# Patient Record
Sex: FEMALE | Employment: UNEMPLOYED | URBAN - METROPOLITAN AREA
[De-identification: names, ages, dates, MRNs, and addresses within clinical notes are randomized per-mention and may not be internally consistent; named-entity substitution may affect disease eponyms.]

---

## 2018-03-07 ENCOUNTER — TRANSFERRED RECORDS (OUTPATIENT)
Dept: HEALTH INFORMATION MANAGEMENT | Facility: CLINIC | Age: 33
End: 2018-03-07

## 2018-03-07 LAB
CHOLEST SERPL-MCNC: 214 MG/DL (ref 100–199)
CREAT SERPL-MCNC: 0.69 MG/DL (ref 0.57–1.11)
GFR SERPL CREATININE-BSD FRML MDRD: >60 ML/MIN/1.73M2
GLUCOSE SERPL-MCNC: 87 MG/DL (ref 65–100)
HBA1C MFR BLD: 5.7 % (ref 0–5.7)
HDLC SERPL-MCNC: 35 MG/DL
LDLC SERPL CALC-MCNC: 109 MG/DL
NONHDLC SERPL-MCNC: 179 MG/DL
POTASSIUM SERPL-SCNC: 4.3 MMOL/L (ref 3.5–5)
TRIGL SERPL-MCNC: 350 MG/DL
TSH SERPL-ACNC: 3.93 UIU/ML (ref 0.35–4.94)

## 2018-08-02 NOTE — PROGRESS NOTES
SUBJECTIVE:   CC: Robina Zuniga is an 32 year old woman who presents for preventive health visit.     New Patient/Transfer of Care  [facility] medical home: [facility]  BENEDICTO/Collect Auth - Signed BENEDICTO for [facility]    Dereje sign-up: Signed up today    Physical   Annual:     Getting at least 3 servings of Calcium per day:  Yes    Bi-annual eye exam:  NO    Dental care twice a year:  NO    Sleep apnea or symptoms of sleep apnea:  None    Diet:  Regular (no restrictions)    Taking medications regularly:  No    Barriers to taking medications:  Not applicable    Medication side effects:  Not applicable    Additional concerns today:  No      Establish Care/Chart Review:    Health Status:  Good - stress and poor sleep due to young children    Goals of Care:  -- stay healthy    Health Maintenance:  Due for:  - HIV - checked during pregnancies  - Pap - not sure when last was    Today's PHQ-2 Score:   PHQ-2 ( 1999 [employer]) 8/7/2018   Q1: Little interest or pleasure in doing things 0   Q2: Feeling down, depressed or hopeless 0   PHQ-2 Score 0   Q1: Little interest or pleasure in doing things Not at all   Q2: Feeling down, depressed or hopeless Not at all   PHQ-2 Score 0       Abuse: Current or Past(Physical, Sexual or Emotional)- No  Do you feel safe in your environment - Yes    Social History   Substance Use Topics     Smoking status: Never Smoker     Smokeless tobacco: Never Used     Alcohol use No     Alcohol Use 8/7/2018   If you drink alcohol do you typically have greater than 3 drinks per day OR greater than 7 drinks per week? Not Applicable   No flowsheet data found.    Reviewed orders with patient.  Reviewed health maintenance and updated orders accordingly - Yes    Mammogram not appropriate for this patient based on age.    Pertinent mammograms are reviewed under the imaging tab.  History of abnormal Pap smear: unknown - requesting records     Reviewed and updated as needed this visit by clinical staff  Tobacco  Allergies  Meds  " Med Hx  Surg Hx  Fam Hx  Soc Hx        Reviewed and updated as needed this visit by Provider            Review of Systems   Constitutional: Negative for chills.   HENT: Negative for congestion and ear pain.    Eyes: Negative for pain.   Respiratory: Negative for cough.    Cardiovascular: Negative for chest pain.   Gastrointestinal: Negative for abdominal pain, constipation, diarrhea and hematochezia.   Genitourinary: Negative for hematuria.   Neurological: Negative for dizziness.        OBJECTIVE:   /72 (BP Location: Right arm, Patient Position: Chair)  Pulse 72  Temp 97.6  F (36.4  C) (Oral)  Ht 5' 3.75\" (1.619 m)  Wt 135 lb 1.6 oz (61.3 kg)  LMP 07/24/2018 (Exact Date)  SpO2 98%  BMI 23.37 kg/m2  Physical Exam  GENERAL: healthy, alert and no distress  EYES: Eyes grossly normal to inspection, PERRL and conjunctivae and sclerae normal  HENT: ear canals and TM's normal, nose and mouth without ulcers or lesions  NECK: no adenopathy, no asymmetry, masses, or scars and thyroid normal to palpation  RESP: lungs clear to auscultation - no rales, rhonchi or wheezes  CV: regular rate and rhythm, normal S1 S2, no S3 or S4, no murmur, click or rub, no peripheral edema and peripheral pulses strong  ABDOMEN: soft, nontender, no hepatosplenomegaly, no masses and bowel sounds normal  MS: no gross musculoskeletal defects noted, no edema  SKIN: no suspicious lesions or rashes  NEURO: Normal strength and tone, mentation intact and speech normal  PSYCH: mentation appears normal, affect normal/bright    Diagnostic Test Results:  none     ASSESSMENT/PLAN:   1. Establishing care with new doctor, encounter for  History reviewed with patient - pending records and recent lab work.  Unsure when last pap smear was.  Will request records.    2. Hypothyroidism, unspecified type  Long-standing history of hypothyroidism.  Was told most recent level was abnormal and she needs to recheck in two months (now).  Will order test today " and request records.  - On synthroid 75 mcg - reports dose has not changed in years.  - TSH with free T4 reflex    3. Iron deficiency anemia, unspecified iron deficiency anemia type  Was told she has type 2 diabetes, but is not on medication.  Was on medications for gestational diabetes during pregnancy last year and has family history of diabetes.  - CBC with platelets    4. Screening for diabetes mellitus  Pt thinks she was told she has type 2 diabetes, but is not on medication.  Was on medications for gestational diabetes during pregnancy last year (2017) and has family history of diabetes.  Will recheck today and request records.  - Hemoglobin A1c    F/u pending lab results and records (unclear if she is due for preventive health exam this year).    Yolie Asencio MD  [facility]

## 2018-08-02 NOTE — PATIENT INSTRUCTIONS
INSTRUCTIONS:  Will check thyroid, diabetes screening, and hemoglobin level  I will contact you with results    Preventive Health Recommendations  Female Ages 26 - 39  Yearly exam:   See your health care provider every year in order to    Review health changes.     Discuss preventive care.      Review your medicines if you your doctor has prescribed any.    Until age 30: Get a Pap test every three years (more often if you have had an abnormal result).    After age 30: Talk to your doctor about whether you should have a Pap test every 3 years or have a Pap test with HPV screening every 5 years.   You do not need a Pap test if your uterus was removed (hysterectomy) and you have not had cancer.  You should be tested each year for STDs (sexually transmitted diseases), if you're at risk.   Talk to your provider about how often to have your cholesterol checked.  If you are at risk for diabetes, you should have a diabetes test (fasting glucose).  Shots: Get a flu shot each year. Get a tetanus shot every 10 years.   Nutrition:     Eat at least 5 servings of fruits and vegetables each day.    Eat whole-grain bread, whole-wheat pasta and brown rice instead of white grains and rice.    Get adequate Calcium and Vitamin D.     Lifestyle    Exercise at least 150 minutes a week (30 minutes a day, 5 days of the week). This will help you control your weight and prevent disease.    Limit alcohol to one drink per day.    No smoking.     Wear sunscreen to prevent skin cancer.    See your dentist every six months for an exam and cleaning.

## 2018-08-07 ENCOUNTER — OFFICE VISIT (OUTPATIENT)
Dept: PEDIATRICS | Facility: CLINIC | Age: 33
End: 2018-08-07
Payer: COMMERCIAL

## 2018-08-07 VITALS
OXYGEN SATURATION: 98 % | DIASTOLIC BLOOD PRESSURE: 72 MMHG | HEIGHT: 64 IN | WEIGHT: 135.1 LBS | TEMPERATURE: 97.6 F | BODY MASS INDEX: 23.06 KG/M2 | SYSTOLIC BLOOD PRESSURE: 120 MMHG | HEART RATE: 72 BPM

## 2018-08-07 DIAGNOSIS — Z76.89 ESTABLISHING CARE WITH NEW DOCTOR, ENCOUNTER FOR: Primary | ICD-10-CM

## 2018-08-07 DIAGNOSIS — E03.9 HYPOTHYROIDISM, UNSPECIFIED TYPE: ICD-10-CM

## 2018-08-07 DIAGNOSIS — D50.9 IRON DEFICIENCY ANEMIA, UNSPECIFIED IRON DEFICIENCY ANEMIA TYPE: ICD-10-CM

## 2018-08-07 DIAGNOSIS — Z13.1 SCREENING FOR DIABETES MELLITUS: ICD-10-CM

## 2018-08-07 LAB
ERYTHROCYTE [DISTWIDTH] IN BLOOD BY AUTOMATED COUNT: 14.5 % (ref 10–15)
HBA1C MFR BLD: 5.4 % (ref 0–5.6)
HCT VFR BLD AUTO: 38.4 % (ref 35–47)
HGB BLD-MCNC: 12.7 G/DL (ref 11.7–15.7)
MCH RBC QN AUTO: 27.8 PG (ref 26.5–33)
MCHC RBC AUTO-ENTMCNC: 33.1 G/DL (ref 31.5–36.5)
MCV RBC AUTO: 84 FL (ref 78–100)
PLATELET # BLD AUTO: 273 10E9/L (ref 150–450)
RBC # BLD AUTO: 4.57 10E12/L (ref 3.8–5.2)
T4 FREE SERPL-MCNC: 1.31 NG/DL (ref 0.76–1.46)
TSH SERPL DL<=0.005 MIU/L-ACNC: 0.39 MU/L (ref 0.4–4)
WBC # BLD AUTO: 4.8 10E9/L (ref 4–11)

## 2018-08-07 PROCEDURE — 84439 ASSAY OF FREE THYROXINE: CPT | Performed by: INTERNAL MEDICINE

## 2018-08-07 PROCEDURE — 84443 ASSAY THYROID STIM HORMONE: CPT | Performed by: INTERNAL MEDICINE

## 2018-08-07 PROCEDURE — 85027 COMPLETE CBC AUTOMATED: CPT | Performed by: INTERNAL MEDICINE

## 2018-08-07 PROCEDURE — 83036 HEMOGLOBIN GLYCOSYLATED A1C: CPT | Performed by: INTERNAL MEDICINE

## 2018-08-07 PROCEDURE — 99385 PREV VISIT NEW AGE 18-39: CPT | Performed by: INTERNAL MEDICINE

## 2018-08-07 PROCEDURE — 99213 OFFICE O/P EST LOW 20 MIN: CPT | Mod: 25 | Performed by: INTERNAL MEDICINE

## 2018-08-07 PROCEDURE — 36415 COLL VENOUS BLD VENIPUNCTURE: CPT | Performed by: INTERNAL MEDICINE

## 2018-08-07 RX ORDER — LEVOTHYROXINE SODIUM 75 UG/1
75 TABLET ORAL DAILY
COMMUNITY
Start: 2018-06-27 | End: 2019-01-11

## 2018-08-07 ASSESSMENT — ENCOUNTER SYMPTOMS
DIARRHEA: 0
HEMATOCHEZIA: 0
CONSTIPATION: 0
EYE PAIN: 0
CHILLS: 0
ABDOMINAL PAIN: 0
COUGH: 0
HEMATURIA: 0
DIZZINESS: 0

## 2018-08-07 NOTE — MR AVS SNAPSHOT
After Visit Summary   8/7/2018    Robina Zuniga    MRN: [id number]           Patient Information     Date Of Birth          1985        Visit Information        Provider Department      8/7/2018 9:30 AM Elzbieta Asencio Mai, MD [facility]        Today's Diagnoses     Establishing care with new doctor, encounter for    -  1    Hypothyroidism, unspecified type        Iron deficiency anemia, unspecified iron deficiency anemia type        Screening for diabetes mellitus          Care Instructions    INSTRUCTIONS:  Will check thyroid, diabetes screening, and hemoglobin level  I will contact you with results    Preventive Health Recommendations  Female Ages 26 - 39  Yearly exam:   See your health care provider every year in order to    Review health changes.     Discuss preventive care.      Review your medicines if you your doctor has prescribed any.    Until age 30: Get a Pap test every three years (more often if you have had an abnormal result).    After age 30: Talk to your doctor about whether you should have a Pap test every 3 years or have a Pap test with HPV screening every 5 years.   You do not need a Pap test if your uterus was removed (hysterectomy) and you have not had cancer.  You should be tested each year for STDs (sexually transmitted diseases), if you're at risk.   Talk to your provider about how often to have your cholesterol checked.  If you are at risk for diabetes, you should have a diabetes test (fasting glucose).  Shots: Get a flu shot each year. Get a tetanus shot every 10 years.   Nutrition:     Eat at least 5 servings of fruits and vegetables each day.    Eat whole-grain bread, whole-wheat pasta and brown rice instead of white grains and rice.    Get adequate Calcium and Vitamin D.     Lifestyle    Exercise at least 150 minutes a week (30 minutes a day, 5 days of the week). This will help you control your weight and prevent disease.    Limit alcohol to one drink per  "day.    No smoking.     Wear sunscreen to prevent skin cancer.    See your dentist every six months for an exam and cleaning.            Follow-ups after your visit        Follow-up notes from your care team     Return in about 1 year (around 8/7/2019).      Who to contact     If you have questions or need follow up information about today's clinic visit or your schedule please contact [facility] directly at [phone].  Normal or non-critical lab and imaging results will be communicated to you by [employer], letter or phone within 4 business days after the clinic has received the results. If you do not hear from us within 7 days, please contact the clinic through [employer] or phone. If you have a critical or abnormal lab result, we will notify you by phone as soon as possible.  Submit refill requests through [employer] or call your pharmacy and they will forward the refill request to us. Please allow 3 business days for your refill to be completed.          Additional Information About Your Visit        [employer] Information     [employer] gives you secure access to your electronic health record. If you see a primary care provider, you can also send messages to your care team and make appointments. If you have questions, please call your primary care clinic.  If you do not have a primary care provider, please call [phone] and they will assist you.        Care EveryWhere ID     This is your Care EveryWhere ID. This could be used by other organizations to access your [facility] medical records  [id number]        Your Vitals Were     Pulse Temperature Height Last Period Pulse Oximetry BMI (Body Mass Index)    72 97.6  F (36.4  C) (Oral) 5' 3.75\" (1.619 m) 07/24/2018 (Exact Date) 98% 23.37 kg/m2       Blood Pressure from Last 3 Encounters:   08/07/18 120/72    Weight from Last 3 Encounters:   08/07/18 135 lb 1.6 oz (61.3 kg)              We Performed the Following     CBC with platelets     Hemoglobin A1c     TSH " with free T4 reflex        Primary Care Provider    None Specified       No primary provider on file.        Equal Access to Services     RASHMI HARDWICK : Hadiliana Johnson, galina carreno, yonatan roberson. So [facility] [phone].    ATENCIÓN: Si habla español, tiene a holloway disposición servicios gratuitos de asistencia lingüística. Llame al [phone].    We comply with applicable federal civil rights laws and [place] laws. We do not discriminate on the basis of race, color, national origin, age, disability, sex, sexual orientation, or gender identity.            Thank you!     Thank you for choosing [facility]  for your care. Our goal is always to provide you with excellent care. Hearing back from our patients is one way we can continue to improve our services. Please take a few minutes to complete the written survey that you may receive in the mail after your visit with us. Thank you!             Your Updated Medication List - Protect others around you: Learn how to safely use, store and throw away your medicines at www.disposemymeds.org.          This list is accurate as of 8/7/18 10:30 AM.  Always use your most recent med list.                   Brand Name Dispense Instructions for use Diagnosis    levothyroxine 75 MCG tablet    SYNTHROID/LEVOTHROID     Take 75 mcg by mouth daily

## 2018-08-07 NOTE — LETTER
[facility]  [address] [place] [address]  [place] [address]                  [phone]   August 21, 2018    Robina Zuniga  [address]   [facility] [address]      Dear Robina,    Your thyroid levels are slightly off. I would like to repeat your thyroid level with a lab only appointment.    The remainder of your lab results (diabetes screen, hemoglobin level) are normal.  Please feel free to call with any questions.  Otherwise, we can discuss further at your next appointment.    Sincerely,    Yolie Asencio MD        Results for orders placed or performed in visit on 08/07/18   Hemoglobin A1c   Result Value Ref Range    Hemoglobin A1C 5.4 0 - 5.6 %   CBC with platelets   Result Value Ref Range    WBC 4.8 4.0 - 11.0 10e9/L    RBC Count 4.57 3.8 - 5.2 10e12/L    Hemoglobin 12.7 11.7 - 15.7 g/dL    Hematocrit 38.4 35.0 - 47.0 %    MCV 84 78 - 100 fl    MCH 27.8 26.5 - 33.0 pg    MCHC 33.1 31.5 - 36.5 g/dL    RDW 14.5 10.0 - 15.0 %    Platelet Count 273 150 - 450 10e9/L   TSH with free T4 reflex   Result Value Ref Range    TSH 0.39 (L) 0.40 - 4.00 mU/L   T4 free   Result Value Ref Range    T4 Free 1.31 0.76 - 1.46 ng/dL

## 2018-08-08 ENCOUNTER — HEALTH MAINTENANCE LETTER (OUTPATIENT)
Age: 33
End: 2018-08-08

## 2019-01-04 ENCOUNTER — OFFICE VISIT (OUTPATIENT)
Dept: PEDIATRICS | Facility: CLINIC | Age: 34
End: 2019-01-04
Payer: COMMERCIAL

## 2019-01-04 VITALS
WEIGHT: 138.3 LBS | DIASTOLIC BLOOD PRESSURE: 60 MMHG | OXYGEN SATURATION: 98 % | BODY MASS INDEX: 23.61 KG/M2 | HEART RATE: 93 BPM | TEMPERATURE: 97.9 F | HEIGHT: 64 IN | SYSTOLIC BLOOD PRESSURE: 102 MMHG

## 2019-01-04 DIAGNOSIS — E03.9 HYPOTHYROIDISM, UNSPECIFIED TYPE: ICD-10-CM

## 2019-01-04 DIAGNOSIS — M79.661 PAIN OF RIGHT LOWER LEG: Primary | ICD-10-CM

## 2019-01-04 PROBLEM — E78.2 MIXED HYPERLIPIDEMIA: Status: ACTIVE | Noted: 2018-03-08

## 2019-01-04 PROBLEM — R73.03 PREDIABETES: Status: ACTIVE | Noted: 2018-03-08

## 2019-01-04 LAB — ERYTHROCYTE [SEDIMENTATION RATE] IN BLOOD BY WESTERGREN METHOD: 35 MM/H (ref 0–20)

## 2019-01-04 PROCEDURE — 80053 COMPREHEN METABOLIC PANEL: CPT | Performed by: INTERNAL MEDICINE

## 2019-01-04 PROCEDURE — 82550 ASSAY OF CK (CPK): CPT | Performed by: INTERNAL MEDICINE

## 2019-01-04 PROCEDURE — 84443 ASSAY THYROID STIM HORMONE: CPT | Performed by: INTERNAL MEDICINE

## 2019-01-04 PROCEDURE — 82306 VITAMIN D 25 HYDROXY: CPT | Performed by: INTERNAL MEDICINE

## 2019-01-04 PROCEDURE — 99214 OFFICE O/P EST MOD 30 MIN: CPT | Performed by: INTERNAL MEDICINE

## 2019-01-04 PROCEDURE — 85652 RBC SED RATE AUTOMATED: CPT | Performed by: INTERNAL MEDICINE

## 2019-01-04 PROCEDURE — 86140 C-REACTIVE PROTEIN: CPT | Performed by: INTERNAL MEDICINE

## 2019-01-04 PROCEDURE — 36415 COLL VENOUS BLD VENIPUNCTURE: CPT | Performed by: INTERNAL MEDICINE

## 2019-01-04 PROCEDURE — 85379 FIBRIN DEGRADATION QUANT: CPT | Performed by: INTERNAL MEDICINE

## 2019-01-04 RX ORDER — DICLOFENAC SODIUM 75 MG/1
75 TABLET, DELAYED RELEASE ORAL 2 TIMES DAILY PRN
Qty: 30 TABLET | Refills: 3 | Status: SHIPPED | OUTPATIENT
Start: 2019-01-04 | End: 2020-01-04

## 2019-01-04 ASSESSMENT — MIFFLIN-ST. JEOR: SCORE: 1314.38

## 2019-01-04 NOTE — PATIENT INSTRUCTIONS
Will check some blood tests today to rule out vitamin D deficiency, inflammatory conditions of the muscle, blood clots, electrolyte and kidney dysfunction.  Start symptoms diary  Follow-up in 1 week to go over result  In the mean time, take the medication prescribed for pain

## 2019-01-04 NOTE — PROGRESS NOTES
"  SUBJECTIVE:   Robina Zuniga is a 33 year old female who presents to clinic today for the following health issues:    Leg pain      Duration: 1 year    Description  Location: left entire leg - \"feels like someone is biting me\"    Intensity:  moderate    Accompanying signs and symptoms: numbness and weakness of left leg    History  Previous similar problem: no   Previous evaluation:  none    Precipitating or alleviating factors:  Trauma or overuse: YES- possible reaction from bad experience with epidural following son's birth.  Aggravating factors include: none    Therapies tried and outcome: nothing    32 yo female here to discuss left leg pain that has been ongoing for the past year.  Reports the pain is intermittent and located in the entire leg (from thigh to foot) but worse in the thigh, sometimes the knee.  Difficulty describing the pain, but says it feels like she is being bitten throughout the leg.  The pain is accompanied by sensation of numbness and weakness in the leg, as if her leg is heavy.  Notices pain after sitting for a while and upon standing.  Feels like when she has the pain, she walks with a limp.  Feels pain is limiting her ability to walk and run.  Course of her symptoms over the past year has been constant, not worsening or improving.    Problem list and histories reviewed & adjusted, as indicated.  Additional history: as documented    Patient Active Problem List   Diagnosis     Hypothyroidism     Iron deficiency anemia     Right leg pain     Vitamin D deficiency     Prediabetes     Mixed hyperlipidemia     No past surgical history on file.    Social History     Tobacco Use     Smoking status: Never Smoker     Smokeless tobacco: Never Used   Substance Use Topics     Alcohol use: No     Family History   Problem Relation Age of Onset     Hypertension Mother      Diabetes Type 2  Mother      Hyperlipidemia Mother      Heart Disease Father          Current Outpatient Medications   Medication Sig " "Dispense Refill     levothyroxine (SYNTHROID/LEVOTHROID) 75 MCG tablet Take 75 mcg by mouth daily       No Known Allergies    Reviewed and updated as needed this visit by clinical staff  Tobacco  Allergies  Meds  Problems       Reviewed and updated as needed this visit by Provider  Problems         ROS:  All other systems on a 7-point review are negative, unless otherwise noted in HPI      OBJECTIVE:     /60   Pulse 93   Temp 97.9  F (36.6  C) (Oral)   Ht 1.613 m (5' 3.5\")   Wt 62.7 kg (138 lb 4.8 oz)   SpO2 98%   BMI 24.11 kg/m    Body mass index is 24.11 kg/m .  GENERAL APPEARANCE: healthy, alert and no distress  MS: extremities normal- no gross deformities noted    Knee Exam (bilateral): Inspection: normal, no effusion, no deformity, Full Range of Motion  Special tests: normal Valgus stress test, normal Varus, negative Lachman's test, negative posterior drawer    Also examined: bilateral hip full range of motion, no leg length difference, foot strong dorsalis pedis pulse   Lower Leg Exam (bilateral): Inspection; no swelling, no ecchymosis, no deformity  Non-tender throughout  Strength:   5/5 Hip flexion L1, 2, 3, Hip extension L5, Knee flexion L5, S1, Knee extension L3, L4, Ankle (plantar) flexion S1, S2, Ankle extension (dorsiflexion) L5, Toe extension S1     Special tests: negative hop test  SKIN: no suspicious lesions or rashes  NEURO: Normal strength and tone, mentation intact, speech normal, DTR symmetrically normal in lower extremities and gait normal including heel/toe/tandem walking  Back exam:  No tenderness and Range of motion not limited by pain    Diagnostic Test Results:  none     ASSESSMENT/PLAN:       1. Pain of right lower leg  Unclear etiology.  Exam of left lower extremity, including knee exam, lumbar exam, and neurological exam is unremarkable.  History is unusual and not isolated to a specific motor or sensory dermatome.  Also hard to tell whether nature of pain is nerve, " muscular, or bone based on her description.  I have asked pt to start a symptom diary to get a better story.  Will initiate a work-up today to rule out vitamin D deficiency, muscle inflammation, DVT (with d-dimer based on low pre-test probability), electrolyte abnormalities.      - Pt to return in 1 week to go over results and next steps.  Will bring symptoms diary.  - In mean time, trial of NSAIDs    - diclofenac (VOLTAREN) 75 MG EC tablet; Take 1 tablet (75 mg) by mouth 2 times daily as needed for moderate pain Take with food  Dispense: 30 tablet; Refill: 3  - Vitamin D deficiency screening  - CRP, inflammation  - ESR: Erythrocyte sedimentation rate  - D dimer, quantitative  - CK total  - Comprehensive metabolic panel (BMP + Alb, Alk Phos, ALT, AST, Total. Bili, TP)    2. Hypothyroidism, unspecified type  Last TSH was low - will recheck and may need to modify dose.  - **TSH with free T4 reflex FUTURE 2mo    Patient Instructions   Will check some blood tests today to rule out vitamin D deficiency, inflammatory conditions of the muscle, blood clots, electrolyte and kidney dysfunction.  Start symptoms diary  Follow-up in 1 week to go over result  In the mean time, take the medication prescribed for pain          Yolie Asencio MD  [facility]

## 2019-01-05 LAB
ALBUMIN SERPL-MCNC: 3.9 G/DL (ref 3.4–5)
ALP SERPL-CCNC: 80 U/L (ref 40–150)
ALT SERPL W P-5'-P-CCNC: 21 U/L (ref 0–50)
ANION GAP SERPL CALCULATED.3IONS-SCNC: 7 MMOL/L (ref 3–14)
AST SERPL W P-5'-P-CCNC: 17 U/L (ref 0–45)
BILIRUB SERPL-MCNC: 0.1 MG/DL (ref 0.2–1.3)
BUN SERPL-MCNC: 10 MG/DL (ref 7–30)
CALCIUM SERPL-MCNC: 9.2 MG/DL (ref 8.5–10.1)
CHLORIDE SERPL-SCNC: 104 MMOL/L (ref 94–109)
CK SERPL-CCNC: 127 U/L (ref 30–225)
CO2 SERPL-SCNC: 27 MMOL/L (ref 20–32)
CREAT SERPL-MCNC: 0.66 MG/DL (ref 0.52–1.04)
CRP SERPL-MCNC: <2.9 MG/L (ref 0–8)
D DIMER PPP FEU-MCNC: 0.2 UG/ML FEU (ref 0–0.5)
GFR SERPL CREATININE-BSD FRML MDRD: >90 ML/MIN/{1.73_M2}
GLUCOSE SERPL-MCNC: 97 MG/DL (ref 70–99)
POTASSIUM SERPL-SCNC: 4.1 MMOL/L (ref 3.4–5.3)
PROT SERPL-MCNC: 7.9 G/DL (ref 6.8–8.8)
SODIUM SERPL-SCNC: 138 MMOL/L (ref 133–144)
TSH SERPL DL<=0.005 MIU/L-ACNC: 0.73 MU/L (ref 0.4–4)

## 2019-01-07 LAB — DEPRECATED CALCIDIOL+CALCIFEROL SERPL-MC: 13 UG/L (ref 20–75)

## 2019-01-08 NOTE — PROGRESS NOTES
"  SUBJECTIVE:   Robina Zuniga is a 33 year old female who presents to clinic today for the following health issues:    Patient is here today to follow up on labs/leg pain. Last OV: 1/4/2019. Patient states: no change.    I last saw Robina a week ago for leg pain that has been chronic and ongoing for about 1 year.  At last visit, history was unclear and I had asked pt to keep a symptoms diary to get a better history of the quality, severity, triggers, etc, however pt was unable to provide this.  No new information compared to last week - again, reporting intermittent pain that is often related to holding her infant on her leg and triggered by standing - feels like \"something is biting her entire leg.\"  Lasts seconds to minutes.  Goes away on it's own.  Sometimes accompanied by numbness and tingling.  Last week, leg exam including neurological exam, joints, skin, and strength were all unremarkable.  Initiated work-up with basic labs - found with vitamin D deficiency.    Problem list and histories reviewed & adjusted, as indicated.  Additional history: as documented    Patient Active Problem List   Diagnosis     Hypothyroidism     Iron deficiency anemia     Right leg pain     Vitamin D deficiency     Prediabetes     Mixed hyperlipidemia     No past surgical history on file.    Social History     Tobacco Use     Smoking status: Never Smoker     Smokeless tobacco: Never Used   Substance Use Topics     Alcohol use: No     Family History   Problem Relation Age of Onset     Hypertension Mother      Diabetes Type 2  Mother      Hyperlipidemia Mother      Heart Disease Father          Current Outpatient Medications   Medication Sig Dispense Refill     diclofenac (VOLTAREN) 75 MG EC tablet Take 1 tablet (75 mg) by mouth 2 times daily as needed for moderate pain Take with food 30 tablet 3     levothyroxine (SYNTHROID/LEVOTHROID) 75 MCG tablet Take 1 tablet (75 mcg) by mouth daily 90 tablet 11     vitamin D2 (ERGOCALCIFEROL) " 66902 units (1250 mcg) capsule Take 1 capsule (50,000 Units) by mouth once a week 8 capsule 0     No Known Allergies    Reviewed and updated as needed this visit by clinical staff       Reviewed and updated as needed this visit by Provider         ROS:  Constitutional, HEENT, cardiovascular, pulmonary, gi and gu systems are negative, except as otherwise noted.    OBJECTIVE:     BP (P) 102/62   Pulse (P) 88   Temp (P) 97.7  F (36.5  C) (Tympanic)   Wt (P) 62.6 kg (138 lb)   SpO2 (P) 98%   BMI (P) 24.06 kg/m    Body mass index is 24.06 kg/m  (pended).  GENERAL: healthy, alert and no distress  EYES: Eyes grossly normal to inspection  NECK: no asymmetry, masses, or scars  MS: no gross musculoskeletal defects noted, no edema  SKIN: no suspicious lesions or rashes  NEURO: mentation intact and speech normal  PSYCH: mentation appears normal and affect normal/bright    Diagnostic Test Results:  none     ASSESSMENT/PLAN:     1. Vitamin D deficiency  Will replace vitamin D and recheck in 8 weeks.  Discussed that vitamin d deficiency is often asymptomatic but can cause MSK pain and may be contributing to her leg pain.  - Vitamin D Deficiency; Future  - vitamin D2 (ERGOCALCIFEROL) 21840 units (1250 mcg) capsule; Take 1 capsule (50,000 Units) by mouth once a week  Dispense: 8 capsule; Refill: 0    2. Right leg pain  Still unclear etiology however exam is unremarkable, labs other than vitamin d are normal, including d-dimer, inflammatory markers, CPK, electrolytes and kidney function all normal.  I did recommend physical therapy and provided some home care tips for knee pain (which I feel is separate to her right leg pain but may contribute).  Will initiate PT and replace vitamin d and have pt follow-up afterwards.  - [facility] REFERRAL; Future    3. Hypothyroidism, unspecified type  Stable, repeat TSH is normal.  Will refill synthroid at current dose.  - levothyroxine (SYNTHROID/LEVOTHROID) 75 MCG  tablet; Take 1 tablet (75 mcg) by mouth daily  Dispense: 90 tablet; Refill: 11    Patient Instructions   1. Your vitamin D is low.    Vitamin D is important for healthy calcium and bone metabolism.  Vitamin D deficiency may cause weakness, muscle pains, and may increase the risk of bone fractures.      Recommendations:  - Begin vitamin D replacement therapy.  I will send a prescription to your pharmacy for 50,000 units (1 tab) weekly for at least 8 weeks.    - Recheck your vitamin D levels after 8 weeks of treatment.    - To maintain normal levels of vitamin D, you may consider daily vitamin D supplementation (1,500 - 2000 units) in addition to light therapy (sunlight or UV rays).    For more information about vitamin D visit: [email or web address] Relations/GetContent.[email or web address]    2. Will refer for physical therapy for right knee (and leg) pain   - If possible, I recommend stationary bicycle for knee pain      KNEE PAIN:    1. Quad strengthening (stationary bike or flutter kick) 30-40 minutes x 3-4 times/week  2. Weight loss will also help  3. Ice after prolonged knee activity  4. Try to avoid high impact activities (i.e. Jogging)  5. Wear shoes with good support        f/u in 2 months.    Greater than 50% of the 25 minute visit was spent in counseling and coordination of care regarding patient conditions above.      Yolie Asencio MD  [facility]

## 2019-01-11 ENCOUNTER — OFFICE VISIT (OUTPATIENT)
Dept: PEDIATRICS | Facility: CLINIC | Age: 34
End: 2019-01-11
Payer: COMMERCIAL

## 2019-01-11 DIAGNOSIS — E03.9 HYPOTHYROIDISM, UNSPECIFIED TYPE: ICD-10-CM

## 2019-01-11 DIAGNOSIS — E55.9 VITAMIN D DEFICIENCY: Primary | ICD-10-CM

## 2019-01-11 DIAGNOSIS — M79.604 RIGHT LEG PAIN: ICD-10-CM

## 2019-01-11 PROCEDURE — 99214 OFFICE O/P EST MOD 30 MIN: CPT | Performed by: INTERNAL MEDICINE

## 2019-01-11 RX ORDER — LEVOTHYROXINE SODIUM 75 UG/1
75 TABLET ORAL DAILY
Qty: 90 TABLET | Refills: 11 | Status: SHIPPED | OUTPATIENT
Start: 2019-01-11 | End: 2019-05-20

## 2019-01-11 RX ORDER — ERGOCALCIFEROL 1.25 MG/1
50000 CAPSULE, LIQUID FILLED ORAL WEEKLY
Qty: 8 CAPSULE | Refills: 0 | Status: SHIPPED | OUTPATIENT
Start: 2019-01-11

## 2019-01-11 NOTE — PATIENT INSTRUCTIONS
1. Your vitamin D is low.    Vitamin D is important for healthy calcium and bone metabolism.  Vitamin D deficiency may cause weakness, muscle pains, and may increase the risk of bone fractures.      Recommendations:  - Begin vitamin D replacement therapy.  I will send a prescription to your pharmacy for 50,000 units (1 tab) weekly for at least 8 weeks.    - Recheck your vitamin D levels after 8 weeks of treatment.    - To maintain normal levels of vitamin D, you may consider daily vitamin D supplementation (1,500 - 2000 units) in addition to light therapy (sunlight or UV rays).    For more information about vitamin D visit: [email or web address]    2. Will refer for physical therapy for right knee (and leg) pain   - If possible, I recommend stationary bicycle for knee pain      KNEE PAIN:    1. Quad strengthening (stationary bike or flutter kick) 30-40 minutes x 3-4 times/week  2. Weight loss will also help  3. Ice after prolonged knee activity  4. Try to avoid high impact activities (i.e. Jogging)  5. Wear shoes with good support

## 2019-05-14 ENCOUNTER — MYC REFILL (OUTPATIENT)
Dept: PEDIATRICS | Facility: CLINIC | Age: 34
End: 2019-05-14

## 2019-05-14 DIAGNOSIS — E03.9 HYPOTHYROIDISM, UNSPECIFIED TYPE: ICD-10-CM

## 2019-05-16 RX ORDER — LEVOTHYROXINE SODIUM 75 UG/1
75 TABLET ORAL DAILY
Qty: 90 TABLET | Refills: 11 | OUTPATIENT
Start: 2019-05-16

## 2019-05-16 NOTE — TELEPHONE ENCOUNTER
Has refills on file. Note sent to the pharmacy.  Alicia Rivers RN  Message handled by Nurse Triage.

## 2019-05-20 ENCOUNTER — MYC REFILL (OUTPATIENT)
Dept: PEDIATRICS | Facility: CLINIC | Age: 34
End: 2019-05-20

## 2019-05-20 DIAGNOSIS — E03.9 HYPOTHYROIDISM, UNSPECIFIED TYPE: ICD-10-CM

## 2019-05-22 RX ORDER — LEVOTHYROXINE SODIUM 75 UG/1
75 TABLET ORAL DAILY
Qty: 90 TABLET | Refills: 2 | Status: SHIPPED | OUTPATIENT
Start: 2019-05-22

## 2020-03-11 ENCOUNTER — HEALTH MAINTENANCE LETTER (OUTPATIENT)
Age: 35
End: 2020-03-11

## 2021-01-03 ENCOUNTER — HEALTH MAINTENANCE LETTER (OUTPATIENT)
Age: 36
End: 2021-01-03

## 2021-04-25 ENCOUNTER — HEALTH MAINTENANCE LETTER (OUTPATIENT)
Age: 36
End: 2021-04-25

## 2021-10-10 ENCOUNTER — HEALTH MAINTENANCE LETTER (OUTPATIENT)
Age: 36
End: 2021-10-10

## 2022-05-21 ENCOUNTER — HEALTH MAINTENANCE LETTER (OUTPATIENT)
Age: 37
End: 2022-05-21

## 2022-09-18 ENCOUNTER — HEALTH MAINTENANCE LETTER (OUTPATIENT)
Age: 37
End: 2022-09-18

## 2023-06-04 ENCOUNTER — HEALTH MAINTENANCE LETTER (OUTPATIENT)
Age: 38
End: 2023-06-04